# Patient Record
(demographics unavailable — no encounter records)

---

## 2024-12-10 NOTE — PHYSICAL EXAM
[de-identified] : S/P bilateral breast augmentation with saline implants. Her right breast sits higher than her left. Incisions healed well bilaterally.

## 2024-12-10 NOTE — CONSULT LETTER
[FreeTextEntry3] : Ana Cristina Scott, RPA-C Breast Surgery [address] [place] [address], [place] [address] (Phone) [phone] (Fax) [phone]

## 2024-12-10 NOTE — REVIEW OF SYSTEMS
[Negative] : Heme/Lymph [FreeTextEntry3] : Blepharitis [FreeTextEntry6] : Ocasional Asthma  [FreeTextEntry7] : Fatty liver [de-identified] : Thyroid problems- benign lumps

## 2024-12-10 NOTE — PHYSICAL EXAM
[de-identified] : S/P bilateral breast augmentation with saline implants. Her right breast sits higher than her left. Incisions healed well bilaterally.

## 2024-12-10 NOTE — HISTORY OF PRESENT ILLNESS
[FreeTextEntry1] : Patient is a 63 year old female here today for a follow up visit. She has a history of right atypia in 2005 s/p right mammo wire localization breast biopsy She deferred Tamoxifen She has a family history of beast cancer in her paternal aunt at age 65. She has a family history of ovarian cancer in her paternal cousin at age 30 7/2003 s/p bilateral breast augmentation with saline implants 6/2006 s/p benign left sono localization breast biopsy 10/24/2024 Bilateral mammogram: Dense breasts. There are bilateral subpectoral saline implants, which partially obscure the breast parenchyma. BI-RADS 2. Bilateral ultrasound: Benign. BI-RADS 1. She had a colonoscopy 5/2023, polyps removed; showed tubular adenoma. She denies any current breast concerns. She thought she felt a lump under her left nipple before the mammogram, but can no longer feel it. Diagnosed with fatty liver. Overdue to see GYN.

## 2024-12-10 NOTE — REVIEW OF SYSTEMS
[Negative] : Heme/Lymph [FreeTextEntry3] : Blepharitis [FreeTextEntry6] : Ocasional Asthma  [FreeTextEntry7] : Fatty liver [de-identified] : Thyroid problems- benign lumps

## 2024-12-10 NOTE — CONSULT LETTER
[FreeTextEntry3] : Ana Cristina Scott, RPA-C Breast Surgery [address] [place] [address], [place] [address] (Phone) [phone] (Fax) [phone]

## 2024-12-10 NOTE — ASSESSMENT
[FreeTextEntry1] : H/O right atypia (2005) Clinical breast exam benign   1. Annual bilateral mammogram and breast ultrasound due 10/2025. 2. Follow up office visit due in 1 year. 3. Advised monthly self breast examinations and advised her to contact me if she has any concerns.

## 2024-12-10 NOTE — CONSULT LETTER
[FreeTextEntry3] : Ana Cristina Scott, RPA-C Breast Surgery [address] [place] [address], [place] [address] (Phone) [phone] (Fax) [phone]

## 2024-12-10 NOTE — PHYSICAL EXAM
[de-identified] : S/P bilateral breast augmentation with saline implants. Her right breast sits higher than her left. Incisions healed well bilaterally.